# Patient Record
Sex: FEMALE | Race: WHITE | Employment: FULL TIME | URBAN - METROPOLITAN AREA
[De-identification: names, ages, dates, MRNs, and addresses within clinical notes are randomized per-mention and may not be internally consistent; named-entity substitution may affect disease eponyms.]

---

## 2017-05-27 ENCOUNTER — HOSPITAL ENCOUNTER (OUTPATIENT)
Age: 42
Discharge: HOME | End: 2017-05-27
Attending: FAMILY MEDICINE
Payer: COMMERCIAL

## 2017-05-27 VITALS
BODY MASS INDEX: 26.9 KG/M2 | DIASTOLIC BLOOD PRESSURE: 71 MMHG | WEIGHT: 137 LBS | HEIGHT: 60 IN | RESPIRATION RATE: 19 BRPM | SYSTOLIC BLOOD PRESSURE: 114 MMHG | OXYGEN SATURATION: 100 % | TEMPERATURE: 98 F | HEART RATE: 88 BPM

## 2017-05-27 DIAGNOSIS — S80.212A ABRASION, LEFT KNEE, INITIAL ENCOUNTER: Primary | ICD-10-CM

## 2017-05-27 PROCEDURE — 99202 OFFICE O/P NEW SF 15 MIN: CPT

## 2017-05-27 NOTE — ED PROVIDER NOTES
Patient Seen in: [address]    History   Patient presents with:  Rash Skin Problem (integumentary)    Stated Complaint: cut on left knee    HPI  41-year-old female presents over concern for a laceration on the anterior a noted above. PSFH elements reviewed from today and agreed except as otherwise stated in HPI.     Physical Exam     ED Triage Vitals   BP 05/27/17 0807 114/71 mmHg   Pulse 05/27/17 0807 88   Resp 05/27/17 0807 19   Temp 05/27/17 0807 97.7 °F (36.5 °C)   [place] [address]  [phone]    In 3 days  if no improvement      Medications Prescribed:  Current Discharge Medication List

## 2017-08-16 ENCOUNTER — ANESTHESIA EVENT (OUTPATIENT)
Dept: SURGERY | Facility: HOSPITAL | Age: 42
End: 2017-08-16
Payer: COMMERCIAL

## 2017-08-17 ENCOUNTER — HOSPITAL ENCOUNTER (OUTPATIENT)
Facility: HOSPITAL | Age: 42
Setting detail: HOSPITAL OUTPATIENT SURGERY
Discharge: HOME | End: 2017-08-17
Attending: PLASTIC SURGERY | Admitting: PLASTIC SURGERY
Payer: COMMERCIAL

## 2017-08-17 ENCOUNTER — ANESTHESIA (OUTPATIENT)
Dept: SURGERY | Facility: HOSPITAL | Age: 42
End: 2017-08-17
Payer: COMMERCIAL

## 2017-08-17 ENCOUNTER — SURGERY (OUTPATIENT)
Age: 42
End: 2017-08-17

## 2017-08-17 VITALS
RESPIRATION RATE: 16 BRPM | BODY MASS INDEX: 25.14 KG/M2 | WEIGHT: 128.06 LBS | DIASTOLIC BLOOD PRESSURE: 68 MMHG | HEART RATE: 83 BPM | HEIGHT: 60 IN | SYSTOLIC BLOOD PRESSURE: 126 MMHG | TEMPERATURE: 99 F | OXYGEN SATURATION: 98 %

## 2017-08-17 LAB
POCT LOT NUMBER: NORMAL
POCT URINE PREGNANCY: NEGATIVE

## 2017-08-17 PROCEDURE — 0H0V0ZZ ALTERATION OF BILATERAL BREAST, OPEN APPROACH: ICD-10-PCS | Performed by: PLASTIC SURGERY

## 2017-08-17 PROCEDURE — 88305 TISSUE EXAM BY PATHOLOGIST: CPT | Performed by: PLASTIC SURGERY

## 2017-08-17 PROCEDURE — 81025 URINE PREGNANCY TEST: CPT | Performed by: PLASTIC SURGERY

## 2017-08-17 RX ORDER — CEFAZOLIN SODIUM 1 G/3ML
2 INJECTION, POWDER, FOR SOLUTION INTRAMUSCULAR; INTRAVENOUS ONCE
Status: DISCONTINUED | OUTPATIENT
Start: 2017-08-17 | End: 2017-08-17

## 2017-08-17 RX ORDER — BUPIVACAINE HYDROCHLORIDE 5 MG/ML
INJECTION, SOLUTION EPIDURAL; INTRACAUDAL AS NEEDED
Status: DISCONTINUED | OUTPATIENT
Start: 2017-08-17 | End: 2017-08-17 | Stop reason: HOSPADM

## 2017-08-17 RX ORDER — ACETAMINOPHEN 500 MG
500 TABLET ORAL EVERY 6 HOURS PRN
COMMUNITY
End: 2019-01-15

## 2017-08-17 RX ORDER — MEPERIDINE HYDROCHLORIDE 25 MG/ML
12.5 INJECTION INTRAMUSCULAR; INTRAVENOUS; SUBCUTANEOUS AS NEEDED
Status: DISCONTINUED | OUTPATIENT
Start: 2017-08-17 | End: 2017-08-17

## 2017-08-17 RX ORDER — HYDROCODONE BITARTRATE AND ACETAMINOPHEN 5; 325 MG/1; MG/1
TABLET ORAL
COMMUNITY
Start: 2017-08-14 | End: 2017-09-18 | Stop reason: ALTCHOICE

## 2017-08-17 RX ORDER — SODIUM CHLORIDE, SODIUM LACTATE, POTASSIUM CHLORIDE, CALCIUM CHLORIDE 600; 310; 30; 20 MG/100ML; MG/100ML; MG/100ML; MG/100ML
INJECTION, SOLUTION INTRAVENOUS CONTINUOUS
Status: DISCONTINUED | OUTPATIENT
Start: 2017-08-17 | End: 2017-08-17

## 2017-08-17 RX ORDER — HYDROMORPHONE HYDROCHLORIDE 1 MG/ML
0.4 INJECTION, SOLUTION INTRAMUSCULAR; INTRAVENOUS; SUBCUTANEOUS EVERY 5 MIN PRN
Status: DISCONTINUED | OUTPATIENT
Start: 2017-08-17 | End: 2017-08-17

## 2017-08-17 RX ORDER — LABETALOL HYDROCHLORIDE 5 MG/ML
5 INJECTION, SOLUTION INTRAVENOUS EVERY 5 MIN PRN
Status: DISCONTINUED | OUTPATIENT
Start: 2017-08-17 | End: 2017-08-17

## 2017-08-17 RX ORDER — MIDAZOLAM HYDROCHLORIDE 1 MG/ML
1 INJECTION INTRAMUSCULAR; INTRAVENOUS EVERY 5 MIN PRN
Status: DISCONTINUED | OUTPATIENT
Start: 2017-08-17 | End: 2017-08-17

## 2017-08-17 RX ORDER — NALOXONE HYDROCHLORIDE 0.4 MG/ML
80 INJECTION, SOLUTION INTRAMUSCULAR; INTRAVENOUS; SUBCUTANEOUS AS NEEDED
Status: DISCONTINUED | OUTPATIENT
Start: 2017-08-17 | End: 2017-08-17

## 2017-08-17 RX ORDER — ONDANSETRON 4 MG/1
TABLET, FILM COATED ORAL
COMMUNITY
Start: 2017-08-14 | End: 2017-09-18 | Stop reason: ALTCHOICE

## 2017-08-17 NOTE — OPERATIVE REPORT
INDICATIONS: The patient is a 42year old female who presented to my office with severe and chronic bilateral symptomatic macromastia.  The patient complained of severe neck, back and shoulder pains which was largely attributed to her large and pendulous br #15 scalpel blade after marking the areolar incision with a 42 mm cookie-cutter device. The pedicle was isolated on all sides using Bovie electrocautery device to dissect down to the underlying pectoralis fascia. Hemostasis was secured.  The circumvertical

## 2017-08-17 NOTE — BRIEF OP NOTE
Pre-Operative Diagnosis: HYPERTROPHIC BREASTS     Post-Operative Diagnosis: * No post-op diagnosis entered *     Procedure Performed:   Procedure(s):  BILATERAL BREAST REDUCTION    Surgeon(s) and Role:     Annia Low MD - Primary    Assistant(s):

## 2017-08-17 NOTE — ANESTHESIA POSTPROCEDURE EVALUATION
[address] Patient Status:  Outpatient in a Bed   Age/Gender 42year old female MRN [id number]   Location [facility] Attending Naty Ugarte MD   Hosp Day # 0 PCP Lupillo Lozada DO       Anesthesia Pos

## 2017-08-17 NOTE — ANESTHESIA PREPROCEDURE EVALUATION
PRE-OP EVALUATION    Patient Name: Flavia Garduno    Pre-op Diagnosis: HYPERTROPHIC BREASTS    Procedure(s):  BILATERAL BREAST REDUCTION    Surgeon(s) and Role:     Juan David Rabago MD - Primary    Pre-op vitals reviewed.   Temp: 98 °F (36.7 °C)  Pulse: 69 Yes  3.5 oz/week    7 Standard drinks or equivalent per week         Comment: 5 DRINKS WEEK       Drug use: No     Available pre-op labs reviewed.                Airway      Mallampati: II       Cardiovascular    Cardiovascular exam normal.         Dental

## 2017-08-17 NOTE — H&P
Pre-op Diagnosis: HYPERTROPHIC BREASTS    The above referenced H&P was reviewed by Abel Patterson MD on 8/17/2017, the patient was examined and no significant changes have occurred in the patient's condition since the H&P was performed.   I discussed with bethanie

## 2017-08-17 NOTE — H&P
PLASTIC SURGERY INITIAL CONSULTATION  CC: Macromastia  HISTORY OF PRESENT ILLNESS:  Ms. Ashley Paredes  is a 42year old female who was referred to my office by Dr. Sundeep Hernandez for evaluation of bilateral breast hypertrophy.  She reports experiencing back pain, neck pain Respiratory: Denies chronic cough or wheezing. : Denies problems with urination, pain on urination.    Denies reacting badly to being put to sleep for surgery  Denies reacting badly to family members being put to sleep  Denies requiring large amounts reduction surgery. I explained that breast reduction surgery can be performed safely under general anesthesia. Discussed with her it is a 2-4 hour outpatient procedure with a 2-6 week recovery.  Discussed the incision as vertical incision with possible late

## 2017-10-31 PROCEDURE — 87624 HPV HI-RISK TYP POOLED RSLT: CPT | Performed by: FAMILY MEDICINE

## 2017-10-31 PROCEDURE — 88175 CYTOPATH C/V AUTO FLUID REDO: CPT | Performed by: FAMILY MEDICINE

## 2019-01-15 PROBLEM — D25.2 INTRAMURAL AND SUBSEROUS LEIOMYOMA OF UTERUS: Status: ACTIVE | Noted: 2019-01-15

## 2019-01-15 PROBLEM — D25.1 INTRAMURAL AND SUBSEROUS LEIOMYOMA OF UTERUS: Status: ACTIVE | Noted: 2019-01-15

## 2019-10-24 PROBLEM — F41.9 ANXIETY: Status: ACTIVE | Noted: 2019-10-24

## 2020-05-07 PROBLEM — E05.90 SUBCLINICAL HYPERTHYROIDISM: Status: ACTIVE | Noted: 2020-05-07

## 2020-05-07 PROBLEM — E04.1 THYROID NODULE: Status: ACTIVE | Noted: 2020-05-07

## 2020-09-13 PROBLEM — E04.1 RIGHT THYROID NODULE: Status: ACTIVE | Noted: 2020-05-07

## (undated) DEVICE — LAPAROTOMY SPONGE - RF AND X-RAY DETECTABLE PRE-WASHED: Brand: SITUATE

## (undated) DEVICE — KIT,RULER,6 IN,DISPOSABLE,STR: Brand: MEDLINE

## (undated) DEVICE — MARKER SKIN 2 TIP

## (undated) DEVICE — BULB SYRINGE,IRRIGATION WITH PROTECTIVE CAP: Brand: DOVER

## (undated) DEVICE — #15 STERILE STAINLESS BLADE: Brand: STERILE STAINLESS BLADES

## (undated) DEVICE — SOL  .9 1000ML BTL

## (undated) DEVICE — BANDAGE ROLL,100% COTTON, 6 PLY, LARGE: Brand: KERLIX

## (undated) DEVICE — GOWN,PREVENTION PLUS,LARGE,STERILE: Brand: MEDLINE

## (undated) DEVICE — SUTURE VLOC 180 3-0 18\" #0124

## (undated) DEVICE — TOWEL: OR BLU 80/CS: Brand: MEDICAL ACTION INDUSTRIES

## (undated) DEVICE — GLOVE ORTHO ALOETOUCH SZ 6-1/2

## (undated) DEVICE — SUTURE PDS II 4-0 RB-1

## (undated) DEVICE — DEVICE CLOSE STRATAFIX L30 CM

## (undated) DEVICE — 3M™ IOBAN™ 2 ANTIMICROBIAL INCISE DRAPE 6650EZ: Brand: IOBAN™ 2

## (undated) DEVICE — DERMABOND LIQUID ADHESIVE

## (undated) DEVICE — SUTURE SILK 2-0 SH

## (undated) DEVICE — PLASTIC BREAST CDS-LF: Brand: MEDLINE INDUSTRIES, INC.

## (undated) DEVICE — CAUTERY BLADE 2IN INS E1455

## (undated) DEVICE — NON-ADHERENT PAD PREPACK: Brand: TELFA

## (undated) DEVICE — STANDARD HYPODERMIC NEEDLE,POLYPROPYLENE HUB: Brand: MONOJECT

## (undated) DEVICE — STERILE HOOK LOCK LATEX FREE ELASTIC BANDAGE 6INX5YD: Brand: HOOK LOCK™

## (undated) DEVICE — 3M™ TEGADERM™ TRANSPARENT FILM DRESSING, 1626W, 4 IN X 4-3/4 IN (10 CM X 12 CM), 50 EACH/CARTON, 4 CARTON/CASE: Brand: 3M™ TEGADERM™

## (undated) DEVICE — DRAPE HALF 40X58 DYNJP2410

## (undated) DEVICE — CG INFILTRATION TUBING: Brand: CG INFILTRATION TUBING

## (undated) DEVICE — #10 STERILE BLADE: Brand: POLYMER COATED BLADES

## (undated) DEVICE — SUTURE PLAIN GUT 5-0 PC-1

## (undated) DEVICE — LAMINECTOMY ARM CRADLE FOAM POSITIONER: Brand: CARDINAL HEALTH

## (undated) DEVICE — KENDALL SCD EXPRESS SLEEVES, KNEE LENGTH, MEDIUM: Brand: KENDALL SCD

## (undated) DEVICE — 3M(TM) TEGADERM(TM) TRANSPARENT FILM DRESSING FRAME STYLE 1628: Brand: 3M™ TEGADERM™

## (undated) DEVICE — SUTURE PDS II 2-0 SH

## (undated) DEVICE — VIOLET BRAIDED (POLYGLACTIN 910), SYNTHETIC ABSORBABLE SUTURE: Brand: COATED VICRYL

## (undated) DEVICE — CSTM UNIVERSAL DRAPE PK: Brand: MEDLINE INDUSTRIES, INC.

## (undated) DEVICE — PROXIMATE RH ROTATING HEAD SKIN STAPLERS (35 WIDE) CONTAINS 35 STAINLESS STEEL STAPLES: Brand: PROXIMATE

## (undated) NOTE — LETTER
Last Revised 02/07/06  Obstructive Sleep Apnea Questionnaire    Clinical signs and symptoms suggesting the possibility of RAJENDRA    1. Predisposing physical characteristics (positive with any of the following present)  ? BMI 35kg/m²  ?  Craniofacial abnormalit pauses which are frightening to the observer, patient regularly falls asleep within minutes after being left unstimulated) in which case they should be treated as though they have severe sleep apnea.     The sleep laboratory’s assessment (none, mild, modera Point Total for B           C. Requirement for postoperative opioids.                Opioid requirement             Points   None 0    Low dose oral opiod 1    High dose oral opioids, parenteral or neuraxial opiods 3      Point Total for C        Estimation

## (undated) NOTE — ED AVS SNAPSHOT
[facility] in [address] [address]    Phone:  [phone]    Fax:  Erica Orta   MRN: [id number]    Department:  [facility] in [address] [place] [address]   Date of Visit:  5/ physician may seek payment directly from you for amounts other than your deductible, co-payment, or co-insurance and for other services not covered under your health insurance plan.   Please contact your insurance company and physician's office to determine different from what your Primary Care doctor has instructed you - please continue to take your medications as instructed by your Primary Care doctor until you can check with your doctor. Please bring the medication list to your next doctor's appointment. coverage. Patient [address] is a Federal Navigator program that can help with your Affordable Care Act coverage, as well as all types of Medicaid plans.   To get signed up and covered, please call [phone] and ask to get set up for an insuran